# Patient Record
Sex: FEMALE | Race: BLACK OR AFRICAN AMERICAN | NOT HISPANIC OR LATINO | Employment: UNEMPLOYED | URBAN - METROPOLITAN AREA
[De-identification: names, ages, dates, MRNs, and addresses within clinical notes are randomized per-mention and may not be internally consistent; named-entity substitution may affect disease eponyms.]

---

## 2024-03-13 ENCOUNTER — HOSPITAL ENCOUNTER (EMERGENCY)
Facility: CLINIC | Age: 29
Discharge: HOME | End: 2024-03-13
Attending: EMERGENCY MEDICINE | Admitting: EMERGENCY MEDICINE
Payer: COMMERCIAL

## 2024-03-13 ENCOUNTER — HOSPITAL ENCOUNTER (EMERGENCY)
Facility: CLINIC | Age: 29
Discharge: LEFT BEFORE DISCHARGE | End: 2024-03-13
Admitting: EMERGENCY MEDICINE
Payer: COMMERCIAL

## 2024-03-13 VITALS
RESPIRATION RATE: 13 BRPM | HEART RATE: 80 BPM | SYSTOLIC BLOOD PRESSURE: 124 MMHG | TEMPERATURE: 98 F | OXYGEN SATURATION: 97 % | DIASTOLIC BLOOD PRESSURE: 82 MMHG

## 2024-03-13 VITALS
SYSTOLIC BLOOD PRESSURE: 128 MMHG | DIASTOLIC BLOOD PRESSURE: 93 MMHG | RESPIRATION RATE: 16 BRPM | OXYGEN SATURATION: 100 % | HEART RATE: 56 BPM | TEMPERATURE: 97 F

## 2024-03-13 DIAGNOSIS — F14.10 COCAINE USE DISORDER (H): ICD-10-CM

## 2024-03-13 DIAGNOSIS — T50.901A ACCIDENTAL DRUG OVERDOSE, INITIAL ENCOUNTER: ICD-10-CM

## 2024-03-13 PROCEDURE — 99281 EMR DPT VST MAYX REQ PHY/QHP: CPT

## 2024-03-13 PROCEDURE — 99283 EMERGENCY DEPT VISIT LOW MDM: CPT | Mod: 27

## 2024-03-13 PROCEDURE — 250N000011 HC RX IP 250 OP 636

## 2024-03-13 RX ORDER — VITAMIN B COMPLEX
25 TABLET ORAL
COMMUNITY
Start: 2023-09-06

## 2024-03-13 RX ORDER — GLYCOPYRROLATE 1 MG/1
1 TABLET ORAL
COMMUNITY
Start: 2023-05-05

## 2024-03-13 RX ORDER — ONDANSETRON 2 MG/ML
INJECTION INTRAMUSCULAR; INTRAVENOUS
Status: COMPLETED
Start: 2024-03-13 | End: 2024-03-13

## 2024-03-13 RX ORDER — GABAPENTIN 600 MG/1
TABLET ORAL
COMMUNITY
Start: 2023-11-06

## 2024-03-13 RX ORDER — SPIRONOLACTONE 100 MG/1
TABLET, FILM COATED ORAL
COMMUNITY
Start: 2022-04-08

## 2024-03-13 RX ORDER — METHYLPHENIDATE HYDROCHLORIDE 36 MG/1
36 TABLET ORAL
COMMUNITY
Start: 2023-11-06

## 2024-03-13 RX ORDER — BUSPIRONE HYDROCHLORIDE 30 MG/1
30 TABLET ORAL
COMMUNITY
Start: 2023-11-06

## 2024-03-13 RX ADMIN — ONDANSETRON: 2 INJECTION INTRAMUSCULAR; INTRAVENOUS at 06:15

## 2024-03-13 ASSESSMENT — ACTIVITIES OF DAILY LIVING (ADL)
ADLS_ACUITY_SCORE: 35
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
## 2024-03-13 NOTE — ED TRIAGE NOTES
Patient was just discharged from ER for intoxication. Patient was found in the hallway clinic vomiting. Patient asking to be checked back in to be seen. Patient c/o feeling nausea and unwell.

## 2024-03-13 NOTE — ED PROVIDER NOTES
History     Chief Complaint:  Drug Overdose    HPI   Christina Kaur is a 28 year old female with history of drug overdose, BPD, bipolar disorder, recurrent major depression, PTSD, methamphetamine use disorder, and tobacco abuse who presents after a drug overdose. Per RN staff who received EMS report, the patient was in a hotel and EMS was called for unresponsiveness. Patient was responsive to a sternal rub, but her breathing was shallow. She received 1 mg of Narcan by EMS but they report that she woke up prior to this. She was using cocaine and states she sniffed it. Blood glucose en route was 189 and she was vitally stable. Vomited once in ED and currently endorses fatigue and nausea.     Independent Historian:   Patient and RN staff who received report from EMS report history as above.     Review of External Notes:   Reviewed patient's office visit from 11/20/2023, patient was seen for abdominal pain and chronic constipation.  She is scheduled for a colonoscopy.    Medications:    Hydroxyzine  Spironolactone  Glycopyrrolate  Fluoxetine  Methylphenidate  Buspirone  Gabapentin     Past Medical History:    Major depression  Methamphetamine use disorder  Borderline personality disorder  Tobacco abuse  PTSD  Drug overdose  Bipolar disorder     Past Surgical History:    Upper endoscopy   Colon polyp    Physical Exam   Patient is given return precautions.  Patient is discharged home.No data found.       Physical Exam    General: Resting comfortably when I enter the room  Eyes: Pupils equal, conjunctivae pink no scleral icterus or conjunctival injection  ENT:  Moist mucus membranes  Respiratory:  Lungs clear to auscultation bilaterally, no crackles/rubs/wheezes.  Good air movement  CV: Normal rate and rhythm, no murmurs  GI:  Abdomen soft and non-distended.  No tenderness, guarding or rebound  Skin: Warm, dry.  No rashes or petechiae  Musculoskeletal: No peripheral edema or calf tenderness  Neuro: Alert and oriented to  person/place/time  Psychiatric: Normal affect    Emergency Department Course   Emergency Department Course & Assessments:    Interventions:  Medications   ondansetron (ZOFRAN) 2 MG/ML injection (  $Given 3/13/24 7103)        Assessments:  609 I entered the patient's room and obtained history.  941 I rechecked the patient. I believe she is safe for discharge at this time.     Independent Interpretation (X-rays, CTs, rhythm strip):  None    Consultations/Discussion of Management or Tests:  None        Social Determinants of Health affecting care:   None    Disposition:  The patient was discharged.     Impression & Plan    CMS Diagnoses: None    MIPS (If applicable):  N/A    Medical Decision Makin-year-old female presents to the emergency department after a cocaine overdose.  She was called to a hotel room where she was found unresponsive.  Patient was breathing very shallow and she was given 1 mg of Narcan by EMS.  She states that she has snorted cocaine.  She states that she does not use cocaine regularly.  She is complaining of nausea.  On exam patient is awake, alert, answering questions appropriately.  Her abdomen is soft and nontender.  She does not have any other complaints at this time.  On reevaluation the patient is feeling better, she is drinking water at bedside.  On second reevaluation of the patient, she is feeling better and would like to go home.        Diagnosis:    ICD-10-CM    1. Accidental drug overdose, initial encounter  T50.901A       2. Cocaine use disorder (H)  F14.10            Discharge Medications:  Discharge Medication List as of 3/13/2024 10:12 AM         Scribe Disclosure:  Merle CAMARENA Hired, am serving as a scribe at 10:10 AM on 3/13/2024 to document services personally performed by Sisi Coates MD based on my observations and the provider's statements to me.   3/13/2024   Sisi Coates MD Richardson, Elizabeth, MD  [phone]